# Patient Record
Sex: MALE | URBAN - METROPOLITAN AREA
[De-identification: names, ages, dates, MRNs, and addresses within clinical notes are randomized per-mention and may not be internally consistent; named-entity substitution may affect disease eponyms.]

---

## 2018-10-17 ENCOUNTER — TELEPHONE (OUTPATIENT)
Dept: DERMATOLOGY | Facility: CLINIC | Age: 38
End: 2018-10-17

## 2018-10-17 NOTE — TELEPHONE ENCOUNTER
[facility] Call Center    Phone Message    May a detailed message be left on voicemail: yes    Reason for Call: Other: Patient states he is being referred by Dr. Igor Wolfe at [facility] to see Dr. Shah for 'Scarring Alopecia'. Patient states he believes that Dr. Wolfe has faxed over referral and medical records to the clinic. Please contact patient directly to discuss appointment at [phone].       Action Taken: Message routed to:  [facility] ([facility] Dermatology Clinic

## 2018-10-26 NOTE — TELEPHONE ENCOUNTER
[facility] Call Center    Phone Message    May a detailed message be left on voicemail: yes    Reason for Call: Other: Pt called to make appt with Dr. Shah did advice to let pt know to follow up with his primary about his medical records. Did advice the pt of the process Dr Shah.     Action Taken: Message routed to:  [facility] ([facility] Dermatology

## 2018-10-29 ENCOUNTER — PRE VISIT (OUTPATIENT)
Dept: DERMATOLOGY | Facility: CLINIC | Age: 38
End: 2018-10-29

## 2018-10-29 NOTE — TELEPHONE ENCOUNTER
FUTURE VISIT INFORMATION      FUTURE VISIT INFORMATION:    Date: N/A    Time:     Location:   REFERRAL INFORMATION:    Referring provider:  DR. SIRI BEGUM    Referring providers clinic:  [facility]    Reason for visit/diagnosis  HAIR LOSS      Action    Action Taken RECEIVED RECORDS WERE REVIEWED AND PATIENT HAS BEEN ACCEPTED INTO DR. ZEE[facility]

## 2018-10-31 NOTE — TELEPHONE ENCOUNTER
Writer left voicemail for patient letting him know he has been accepted into Dr. Shah's [facility]. Writer left direct number for patient to call back.

## 2019-02-04 ENCOUNTER — OFFICE VISIT (OUTPATIENT)
Dept: DERMATOLOGY | Facility: CLINIC | Age: 39
End: 2019-02-04
Payer: COMMERCIAL

## 2019-02-04 VITALS — RESPIRATION RATE: 18 BRPM | HEART RATE: 67 BPM | SYSTOLIC BLOOD PRESSURE: 125 MMHG | DIASTOLIC BLOOD PRESSURE: 78 MMHG

## 2019-02-04 DIAGNOSIS — L30.9 DERMATITIS: ICD-10-CM

## 2019-02-04 DIAGNOSIS — L65.9 LOSS OF HAIR: ICD-10-CM

## 2019-02-04 DIAGNOSIS — L66.2 FOLLICULITIS DECALVANS: ICD-10-CM

## 2019-02-04 DIAGNOSIS — L66.2 FOLLICULITIS DECALVANS: Primary | ICD-10-CM

## 2019-02-04 LAB
BASOPHILS # BLD AUTO: 0 10E9/L (ref 0–0.2)
BASOPHILS NFR BLD AUTO: 0.8 %
DEPRECATED CALCIDIOL+CALCIFEROL SERPL-MC: 30 UG/L (ref 20–75)
DIFFERENTIAL METHOD BLD: ABNORMAL
EOSINOPHIL # BLD AUTO: 0.2 10E9/L (ref 0–0.7)
EOSINOPHIL NFR BLD AUTO: 4.5 %
ERYTHROCYTE [DISTWIDTH] IN BLOOD BY AUTOMATED COUNT: 12.4 % (ref 10–15)
FERRITIN SERPL-MCNC: 62 NG/ML (ref 26–388)
GRAM STN SPEC: ABNORMAL
HCT VFR BLD AUTO: 40.7 % (ref 40–53)
HGB BLD-MCNC: 14.3 G/DL (ref 13.3–17.7)
IMM GRANULOCYTES # BLD: 0 10E9/L (ref 0–0.4)
IMM GRANULOCYTES NFR BLD: 0.3 %
IRON SATN MFR SERPL: 34 % (ref 15–46)
IRON SERPL-MCNC: 90 UG/DL (ref 35–180)
LYMPHOCYTES # BLD AUTO: 1 10E9/L (ref 0.8–5.3)
LYMPHOCYTES NFR BLD AUTO: 27.7 %
MCH RBC QN AUTO: 31.2 PG (ref 26.5–33)
MCHC RBC AUTO-ENTMCNC: 35.1 G/DL (ref 31.5–36.5)
MCV RBC AUTO: 89 FL (ref 78–100)
MONOCYTES # BLD AUTO: 0.3 10E9/L (ref 0–1.3)
MONOCYTES NFR BLD AUTO: 9.2 %
NEUTROPHILS # BLD AUTO: 2.1 10E9/L (ref 1.6–8.3)
NEUTROPHILS NFR BLD AUTO: 57.5 %
NRBC # BLD AUTO: 0 10*3/UL
NRBC BLD AUTO-RTO: 0 /100
PLATELET # BLD AUTO: 213 10E9/L (ref 150–450)
RBC # BLD AUTO: 4.58 10E12/L (ref 4.4–5.9)
SPECIMEN SOURCE: ABNORMAL
T4 FREE SERPL-MCNC: 0.82 NG/DL (ref 0.76–1.46)
TIBC SERPL-MCNC: 265 UG/DL (ref 240–430)
TSH SERPL DL<=0.005 MIU/L-ACNC: 6.9 MU/L (ref 0.4–4)
WBC # BLD AUTO: 3.6 10E9/L (ref 4–11)

## 2019-02-04 RX ORDER — BUSPIRONE HYDROCHLORIDE 10 MG/1
TABLET ORAL
COMMUNITY
Start: 2018-01-01

## 2019-02-04 RX ORDER — ACITRETIN 25 MG/1
50 CAPSULE ORAL
COMMUNITY
Start: 2017-11-13 | End: 2019-03-16

## 2019-02-04 RX ORDER — ACETAMINOPHEN 325 MG/1
TABLET ORAL
COMMUNITY

## 2019-02-04 RX ORDER — ESCITALOPRAM OXALATE 20 MG/1
TABLET ORAL
Refills: 0 | COMMUNITY
Start: 2018-11-05

## 2019-02-04 RX ORDER — ESCITALOPRAM OXALATE 10 MG/1
TABLET ORAL
Refills: 12 | COMMUNITY
Start: 2019-01-09

## 2019-02-04 RX ORDER — FINASTERIDE 5 MG/1
10 TABLET, FILM COATED ORAL
COMMUNITY
Start: 2018-06-01

## 2019-02-04 RX ORDER — KETOCONAZOLE 20 MG/ML
SHAMPOO TOPICAL
Qty: 120 ML | Refills: 5 | Status: SHIPPED | OUTPATIENT
Start: 2019-02-04

## 2019-02-04 RX ORDER — SILDENAFIL CITRATE 20 MG/1
TABLET ORAL
Refills: 2 | COMMUNITY
Start: 2019-01-10

## 2019-02-04 RX ORDER — INFLUENZA A VIRUS A/VICTORIA/2454/2019 IVR-207 (H1N1) ANTIGEN (PROPIOLACTONE INACTIVATED), INFLUENZA A VIRUS A/HONG KONG/2671/2019 IVR-208 (H3N2) ANTIGEN (PROPIOLACTONE INACTIVATED), INFLUENZA B VIRUS B/VICTORIA/705/2018 BVR-11 ANTIGEN (PROPIOLACTONE INACTIVATED), INFLUENZA B VIRUS B/PHUKET/3073/2013 BVR-1B ANTIGEN (PROPIOLACTONE INACTIVATED) 15; 15; 15; 15 UG/.5ML; UG/.5ML; UG/.5ML; UG/.5ML
INJECTION, SUSPENSION INTRAMUSCULAR
COMMUNITY
Start: 2018-11-20

## 2019-02-04 RX ORDER — ESCITALOPRAM OXALATE 10 MG/1
10 TABLET ORAL
COMMUNITY
Start: 2018-01-01

## 2019-02-04 RX ORDER — PREDNISONE 10 MG/1
TABLET ORAL
Refills: 0 | COMMUNITY
Start: 2018-09-20

## 2019-02-04 RX ORDER — TRIAMCINOLONE ACETONIDE 1 MG/ML
LOTION TOPICAL
COMMUNITY
Start: 2015-12-29

## 2019-02-04 RX ORDER — FLUOCINONIDE TOPICAL SOLUTION USP, 0.05% 0.5 MG/ML
SOLUTION TOPICAL
COMMUNITY
Start: 2018-05-11

## 2019-02-04 ASSESSMENT — PAIN SCALES - GENERAL
PAINLEVEL: NO PAIN (0)
PAINLEVEL: NO PAIN (0)

## 2019-02-04 NOTE — PROGRESS NOTES
"[facility] Dermatology Note      Dermatology Problem List:  1. Scarring/androgenic alopecia  -differential lichen planopilaris vs folliculitis decalvans  -s/p two punch biopsies of the scalp vertex on 2/4/19, pathology and culture/gram stain pending   -current treatment: minoxidil once daily, finasteride 10 mg daily, ketoconazole 2% shampoo, and occasional use of Lidex 0.05% external solution  -labs pending (2/4/19): testosterone, TSH w/ free T4, vitamin D, iron studies, DHEA-S  -past treatments with minimal/no improvement:    Doxycycline and lidex: April 2016 - February 2017     Hydroxychlorquine: October 2016 - December 2016, discontinued 2/2 morbilliform rash eruption     Dapsone: January 2017 - May 2017, discontinued because of blurry vision     Acetretin: May 2017 - January 2018, discontinued because not showing any improvement    Finasteride (10 mg) and Minoxidil w/ occasional use of Lidex: January 2018 - present     Prednisone burst for 6 weeks in August 2018     Encounter Date: Feb 4, 2019    CC:  Chief Complaint   Patient presents with     Hair/Scalp Problem     Florin is here today to be seen for hair loss- Dr. Wolfe referral.          History of Present Illness:  Mr. Florin Lozoya is a 38 year old male who presents as a referral from Dr. Igor Wolfe for scarring/androgenic alopecia. Notes that about four years ago, he started having itchiness on the back of his scalp and neck. The area would itch so much in his sleep and would wake up with blood on his pillow. Noted bumps and scabs from the itching. Tried some different shampoos by his PCP initially which didn't seem to help. Was then referred to Dr. Wolfe in 2016 who biopsied the scalp and showed a scarring process concerning for folliculitis decalvans. Per chart review, the biopsy was noted to have a \"mixture of inflammatory cells including a pustule that extends into the superficial portion of a follicle\" and was thought to " represent folliculitis decalvans. Has tried a few different medications (see below for time course) including doxycycline, dapsone, acetretin, plaquenil, and a 6 week oral steroid burst which did not have any improvement.     Doxycycline and lidex: April 2016 - February 2017     Hydroxychlorquine: October 2016 - December 2016, discontinued 2/2 morbilliform rash eruption     Dapsone: January 2017 - May 2017, discontinued because of blurry vision     Acetretin: May 2017 - January 2018, discontinued because not showing any improvement    Finasteride (10 mg) and Minoxidil w/ occasional use of Lidex: January 2018 - present     Prednisone burst for 6 weeks in August 2018     Patient notes that he has also tried intralesional steroid injections which haven't seemed to make a difference. Nothing that he has tried so far has seemed to work.        Notes he showers once a day and is currently using some OGX products for shampoo and conditioner and will also use a pomade for hair styling. Denies any flares of his scalp when using different hair products. Does note some dry and flaky scalp when he itches. States that showering helps manage the itching and he tries to use lukewarm water to not dry out the scalp. The itching will persist throughout the day but will worsen toward night time and about once a week, he will need to take a night time shower to help calm down his scalp. Today in clinic notes that the vertex of his scalp is the most itchy. Denies any burning or pustule formation. Eats a well balanced diet. Denies any other areas of hair loss on his body, rashes or skin problems.     Past Medical History:   There is no problem list on file for this patient.    History reviewed. No pertinent past medical history.  History reviewed. No pertinent surgical history.    Social History:  Patient reports that  has never smoked. he has never used smokeless tobacco.    Family History:  History reviewed. No pertinent family  history.    Medications:  Current Outpatient Medications   Medication Sig Dispense Refill     acitretin (SORIATANE) 25 MG CAPS capsule Take 50 mg by mouth       busPIRone (BUSPAR) 10 MG tablet TAKE 1 TABLET BY MOUTH TWICE DAILY       escitalopram (LEXAPRO) 10 MG tablet Take 10 mg by mouth       finasteride (PROSCAR) 5 MG tablet Take 10 mg by mouth       fluocinonide (LIDEX) 0.05 % external solution        triamcinolone (KENALOG) 0.1 % external lotion        acetaminophen (TYLENOL) 325 MG tablet        AFLURIA QUADRIVALENT 0.5 ML injection        escitalopram (LEXAPRO) 10 MG tablet TK 1 T PO QD  12     escitalopram (LEXAPRO) 20 MG tablet TK 1 T PO QD  0     predniSONE (DELTASONE) 10 MG tablet   0     sildenafil (REVATIO) 20 MG tablet Take one tablet by mouth one hour prior to sex as needed. Do not take more than one dose every 24 hours.  2     Allergies   Allergen Reactions     Hydroxychloroquine Rash     Morbilliform eruption w/o systemic symptoms. Seen by Dr. Wolfe           Review of Systems:  -As per HPI  -Constitutional: Otherwise feeling well today, in usual state of health.  -Skin: As above in HPI. No additional skin concerns.    Physical exam:  Vitals: /78 (BP Location: Right arm, Patient Position: Sitting, Cuff Size: Adult Regular)   Pulse 67   Resp 18   GEN: This is a well developed, well-nourished male in no acute distress, in a pleasant mood.    SKIN: Focused examination of the scalp, face and hands was performed.  -mild background erythema of the scalp with perifollicular erythema ad scale covering approximately 80% of the scalp  -no papules or pustules present but some areas with small angulated eschars from excoriation  -mild alopecia at the vertex and along fronto-temporal regions   -No other lesions of concern on areas examined.     Impression/Plan:  1. Scarring alopecia w/ androgenic involvement    Differential diagnosis at this time points more toward lichen planopilaris than original  diagnosis of mild folliculitis decalvans. Given diffuse scalp involvement on exam with perifollicular erythema and scale, more demonstrative of lichen planopilaris at this time. No papules or pustules in the posterior scalp to suggest more of a folliculitis decalvans however at this time, patient has been refractory to multiple medication regimens over a period of 2+ years meriting further workup and rebiopsyig of lesions. Two biopsies taken in clinic today with one being sent for gram stain and culture to assess for any bacterial involvement of the scalp     Labs ordered for today:  testosterone, TSH w/ free T4, vitamin D, iron studies, DHEA-S    Sent prescription for ketoconazole 2% shampoo     Punch biopsy: After discussion of benefits and risks including but not limited to bleeding, infection, scar, incomplete removal, recurrence, and non-diagnostic biopsy, written consent and photographs were obtained. Time-out was performed. The area was cleaned with isopropyl alcohol. 1.5 mL of 1% lidocaine with 1:100,000 epinephrine was injected to obtain adequate anesthesia of the lesion on the scalp. Two  4 mm punch biopsy was performed.  3-0 prolene sutures were utilized to approximate the epidermal edges.  White petroleum jelly/VaselineTM and a bandage was applied to the wound.  Explicit verbal and written wound care instructions were provided. One punch biopsy was sent for culture and another for pathology.  The patient left the Dermatology Clinic in good condition. prolene      CC Igor Wolfe MD  [facility] DERMATOLOGY  [address] [place] [phone] on close of this encounter.  Follow-up in 3.5 months for ongoing follow-up.     Staff Involved:  I, Win Mclean, MS4, saw and examined the patient in the presence of Dr. Shah.    Staff Physician:  I was present with the medical student who participated in the service and in the documentation of the note. I have verified the history and personally performed  the physical exam and medical decision making. I agree with the assessment and plan of care as documented in the note.  The biopsy procedure was done together.    Julianna Shah MD  Professor and Chair  Department of Dermatology  [facility]: Phone: [phone], Fax:[phone]  [facility]: Phone: [phone], Fax: [phone]

## 2019-02-04 NOTE — LETTER
2/4/2019       RE: Florin Lozoya  [address] [place] [address]     Dear Colleague,    Thank you for referring your patient, Florin Lozoya, to the [facility] DERMATOLOG[facility] at [facility]. Please see a copy of my visit note below.    [facility] Dermatology Note      Dermatology Problem List:  1. Scarring/androgenic alopecia  -differential lichen planopilaris vs folliculitis decalvans  -s/p two punch biopsies of the scalp vertex on 2/4/19, pathology and culture/gram stain pending   -current treatment: minoxidil once daily, finasteride 10 mg daily, ketoconazole 2% shampoo, and occasional use of Lidex 0.05% external solution  -labs pending (2/4/19): testosterone, TSH w/ free T4, vitamin D, iron studies, DHEA-S  -past treatments with minimal/no improvement:    Doxycycline and lidex: April 2016 - February 2017     Hydroxychlorquine: October 2016 - December 2016, discontinued 2/2 morbilliform rash eruption     Dapsone: January 2017 - May 2017, discontinued because of blurry vision     Acetretin: May 2017 - January 2018, discontinued because not showing any improvement    Finasteride (10 mg) and Minoxidil w/ occasional use of Lidex: January 2018 - present     Prednisone burst for 6 weeks in August 2018     Encounter Date: Feb 4, 2019    CC:  Chief Complaint   Patient presents with     Hair/Scalp Problem     Florin is here today to be seen for hair loss- Dr. Wolfe referral.          History of Present Illness:  Mr. Florin Lozoya is a 38 year old male who presents as a referral from Dr. Igor Wolfe for scarring/androgenic alopecia. Notes that about four years ago, he started having itchiness on the back of his scalp and neck. The area would itch so much in his sleep and would wake up with blood on his pillow. Noted bumps and scabs from the itching. Tried some different shampoos by his PCP initially which didn't seem to help. Was then referred to Dr. Wolfe in  "2016 who biopsied the scalp and showed a scarring process concerning for folliculitis decalvans. 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 Has tried a few different medications (see below for time course) including doxycycline, dapsone, acetretin, plaquenil, and a 6 week oral steroid burst which did not have any improvement.     Doxycycline and lidex: April 2016 - February 2017     Hydroxychlorquine: October 2016 - December 2016, discontinued 2/2 morbilliform rash eruption     Dapsone: January 2017 - May 2017, discontinued because of blurry vision     Acetretin: May 2017 - January 2018, discontinued because not showing any improvement    Finasteride (10 mg) and Minoxidil w/ occasional use of Lidex: January 2018 - present     Prednisone burst for 6 weeks in August 2018     Patient notes that he has also tried intralesional steroid injections which haven't seemed to make a difference. Nothing that he has tried so far has seemed to work.        Notes he showers once a day and is currently using some OGX products for shampoo and conditioner and will also use a pomade for hair styling. Denies any flares of his scalp when using different hair products. Does note some dry and flaky scalp when he itches. States that showering helps manage the itching and he tries to use lukewarm water to not dry out the scalp. The itching will persist throughout the day but will worsen toward night time and about once a week, he will need to take a night time shower to help calm down his scalp. Today in clinic notes that the vertex of his scalp is the most itchy. Denies any burning or pustule formation. Eats a well balanced diet. Denies any other areas of hair loss on his body, rashes or skin problems.     Past Medical History:   There is no problem list on file for this " patient.    History reviewed. No pertinent past medical history.  History reviewed. No pertinent surgical history.    Social History:  Patient reports that  has never smoked. he has never used smokeless tobacco.    Family History:  History reviewed. No pertinent family history.    Medications:  Current Outpatient Medications   Medication Sig Dispense Refill     acitretin (SORIATANE) 25 MG CAPS capsule Take 50 mg by mouth       busPIRone (BUSPAR) 10 MG tablet TAKE 1 TABLET BY MOUTH TWICE DAILY       escitalopram (LEXAPRO) 10 MG tablet Take 10 mg by mouth       finasteride (PROSCAR) 5 MG tablet Take 10 mg by mouth       fluocinonide (LIDEX) 0.05 % external solution        triamcinolone (KENALOG) 0.1 % external lotion        acetaminophen (TYLENOL) 325 MG tablet        AFLURIA QUADRIVALENT 0.5 ML injection        escitalopram (LEXAPRO) 10 MG tablet TK 1 T PO QD  12     escitalopram (LEXAPRO) 20 MG tablet TK 1 T PO QD  0     predniSONE (DELTASONE) 10 MG tablet   0     sildenafil (REVATIO) 20 MG tablet Take one tablet by mouth one hour prior to sex as needed. Do not take more than one dose every 24 hours.  2     Allergies   Allergen Reactions     Hydroxychloroquine Rash     Morbilliform eruption w/o systemic symptoms. Seen by Dr. Wolfe           Review of Systems:  -As per HPI  -Constitutional: Otherwise feeling well today, in usual state of health.  -Skin: As above in HPI. No additional skin concerns.    Physical exam:  Vitals: /78 (BP Location: Right arm, Patient Position: Sitting, Cuff Size: Adult Regular)   Pulse 67   Resp 18   GEN: This is a well developed, well-nourished male in no acute distress, in a pleasant mood.    SKIN: Focused examination of the scalp, face and hands was performed.  -mild background erythema of the scalp with perifollicular erythema ad scale covering approximately 80% of the scalp  -no papules or pustules present but some areas with small angulated eschars from excoriation  -mild  alopecia at the vertex and along fronto-temporal regions   -No other lesions of concern on areas examined.     Impression/Plan:  1. Scarring alopecia w/ androgenic involvement    Differential diagnosis at this time points more toward lichen planopilaris than original diagnosis of mild folliculitis decalvans. Given diffuse scalp involvement on exam with perifollicular erythema and scale, more demonstrative of lichen planopilaris at this time. No papules or pustules in the posterior scalp to suggest more of a folliculitis decalvans however at this time, patient has been refractory to multiple medication regimens over a period of 2+ years meriting further workup and rebiopsyig of lesions. Two biopsies taken in clinic today with one being sent for gram stain and culture to assess for any bacterial involvement of the scalp     Labs ordered for today:  testosterone, TSH w/ free T4, vitamin D, iron studies, DHEA-S    Sent prescription for ketoconazole 2% shampoo     Punch biopsy: After discussion of benefits and risks including but not limited to bleeding, infection, scar, incomplete removal, recurrence, and non-diagnostic biopsy, written consent and photographs were obtained. Time-out was performed. The area was cleaned with isopropyl alcohol. 1.5 mL of 1% lidocaine with 1:100,000 epinephrine was injected to obtain adequate anesthesia of the lesion on the scalp. Two  4 mm punch biopsy was performed.  3-0 prolene sutures were utilized to approximate the epidermal edges.  White petroleum jelly/VaselineTM and a bandage was applied to the wound.  Explicit verbal and written wound care instructions were provided. One punch biopsy was sent for culture and another for pathology.  The patient left the Dermatology Clinic in good condition. prolene      CC Igor Wolfe MD  [facility] DERMATOLOGY  [address] [place] [phone] on close of this encounter.  Follow-up in 3.5 months for ongoing follow-up.     Staff  Involved:  I, Win Mclean, MS4, saw and examined the patient in the presence of Dr. Shah.    Staff Physician:  I was present with the medical student who participated in the service and in the documentation of the note. I have verified the history and personally performed the physical exam and medical decision making. I agree with the assessment and plan of care as documented in the note.  The biopsy procedure was done together.                            Pictures were placed in Pt's chart today for future reference.    Julianna Shah MD

## 2019-02-04 NOTE — PATIENT INSTRUCTIONS
Today during your visit:   Today we took two biopsies from your scalp, one that will be sent for culture to make sure there is no scalp infection, and another one to help us with the diagnosis. We are also going to get some labs from you today after your appointment to make sure that you are in a position for optimal hair growth.     We will call you with the results of the biopsy and over the phone we will make a plan together for treatment. We will plan to have you return in about 3.5 months then for follow-up.     We also sent some prescription shampoo to your pharmacy for keeping your scalp healthy.     Wound Care After a Biopsy    What is a skin biopsy?  A skin biopsy allows the doctor to examine a very small piece of tissue under the microscope to determine the diagnosis and the best treatment for the skin condition. A local anesthetic (numbing medicine)  is injected with a very small needle into the skin area to be tested. A small piece of skin is taken from the area. Sometimes a suture (stitch) is used.     What are the risks of a skin biopsy?  I will experience scar, bleeding, swelling, pain, crusting and redness. I may experience incomplete removal or recurrence. Risks of this procedure are excessive bleeding, bruising, infection, nerve damage, numbness, thick (hypertrophic or keloidal) scar and non-diagnostic biopsy.    How should I care for my wound for the first 24 hours?    Keep the wound dry and covered for 24 hours    If it bleeds, hold direct pressure on the area for 15 minutes. If bleeding does not stop then go to the emergency room    Avoid strenuous exercise the first 1-2 days or as your doctor instructs you    How should I care for the wound after 24 hours?    After 24 hours, remove the bandage    You may bathe or shower as normal    If you had a scalp biopsy, you can shampoo as usual and can use shower water to clean the biopsy site daily    Clean the wound twice a day with gentle soap and  water    Do not scrub, be gentle    Apply white petroleum/Vaseline after cleaning the wound with a cotton swab or a clean finger, and keep the site covered with a Bandaid /bandage. Bandages are not necessary with a scalp biopsy    If you are unable to cover the site with a Bandaid /bandage, re-apply ointment 2-3 times a day to keep the site moist. Moisture will help with healing    Avoid strenuous activity for first 1-2 days    Avoid lakes, rivers, pools, and oceans until the stitches are removed or the site is healed    How do I clean my wound?    Wash hands thoroughly with soap or use hand  before all wound care    Clean the wound with gentle soap and water    Apply white petroleum/Vaseline  to wound after it is clean    Replace the Bandaid /bandage to keep the wound covered for the first few days or as instructed by your doctor    If you had a scalp biopsy, warm shower water to the area on a daily basis should suffice    What should I use to clean my wound?     Cotton-tipped applicators (Qtips )    White petroleum jelly (Vaseline ). Use a clean new container and use Q-tips to apply.    Bandaids   as needed    Gentle soap     How should I care for my wound long term?    Do not get your wound dirty    Keep up with wound care for one week or until the area is healed.    A small scab will form and fall off by itself when the area is completely healed. The area will be red and will become pink in color as it heals. Sun protection is very important for how your scar will turn out. Sunscreen with an SPF 30 or greater is recommended once the area is healed.    If you have stitches, stitches need to be removed in 14 days. You may return to our clinic for this or you may have it done locally at your doctor s office.    You should have some soreness but it should be mild and slowly go away over several days. Talk to your doctor about using tylenol for pain,    When should I call my doctor?  If you have increased:      Pain or swelling    Pus or drainage (clear or slightly yellow drainage is ok)    Temperature over 100F    Spreading redness or warmth around wound    When will I hear about my results?  The biopsy results can take 2-3 weeks to come back. The clinic will call you with the results, send you a [employer] message, or have you schedule a follow-up clinic or phone time to discuss the results. Contact our clinics if you do not hear from us in 3 weeks.     Who should I call with questions?    [facility]: [phone]     [facility]: [phone]    For urgent needs outside of business hours call the [facility] at [phone] and ask for the dermatology resident on call

## 2019-02-04 NOTE — NURSING NOTE
Lidocaine-epinephrine 1-1:445874 % injection   3mL once for one use, starting 2/4/2019 ending 2/4/2019,  2mL disp, R-0, injection  Injected by Dr. Shah

## 2019-02-04 NOTE — NURSING NOTE
Dermatology Rooming Note    Florin Lozoya's goals for this visit include:   Chief Complaint   Patient presents with     Hair/Scalp Problem     Florin is here today to be seen for hair loss- Dr. Wolfe referral.      ROSALIO Engel

## 2019-02-05 LAB — DHEA-S SERPL-MCNC: 97 UG/DL (ref 80–560)

## 2019-02-06 LAB
SHBG SERPL-SCNC: 58 NMOL/L (ref 11–80)
TESTOST FREE SERPL-MCNC: 9.47 NG/DL (ref 4.7–24.4)
TESTOST SERPL-MCNC: 636 NG/DL (ref 240–950)

## 2019-02-08 LAB
BACTERIA SPEC CULT: ABNORMAL
BACTERIA SPEC CULT: ABNORMAL
SPECIMEN SOURCE: ABNORMAL

## 2019-02-25 LAB — COPATH REPORT: NORMAL

## 2019-03-14 ENCOUNTER — DOCUMENTATION ONLY (OUTPATIENT)
Dept: DERMATOLOGY | Facility: CLINIC | Age: 39
End: 2019-03-14

## 2019-03-16 ENCOUNTER — TELEPHONE (OUTPATIENT)
Dept: DERMATOLOGY | Facility: CLINIC | Age: 39
End: 2019-03-16

## 2019-03-16 DIAGNOSIS — L66.9 CICATRICIAL ALOPECIA: Primary | ICD-10-CM

## 2019-03-16 DIAGNOSIS — L30.9 DERMATITIS: ICD-10-CM

## 2019-03-16 RX ORDER — CLOBETASOL PROPIONATE 0.05 G/100ML
SHAMPOO TOPICAL
Qty: 60 ML | Refills: 3 | Status: SHIPPED | OUTPATIENT
Start: 2019-03-16

## 2019-03-16 NOTE — TELEPHONE ENCOUNTER
This afternoon I had the opportunity to review Mr. Lozoya's blood test results and biopsy reports.  He notes his scalp is very pruritic, especially in the evening and especially around the biopsy sites, which he reports are doing well. He notes that since his last visit with us, he is aiming to slowly discontinue his buspar and lexapro medications and is slowly tapering off these. In regards to his scalp, he reports using 2% ketoconzole shampoo twice a week and has added Head & Shoulders to his routine as well OGX shampoo. He shampoos daily.    We reviewed his biopsy and blood test results. The blood test results are fine overall but his TSH was slightly elevated at 6.90 but the T4 was normal. Florin's white blood count was slightly low at 3.6 (4-11). The biopsies were interpreted as follows:    A-B - Horizontal and vertical sections are reviewed.  Vertical sections   demonstrate 3 hair follicles, one of which is in non-anagen phase, with partially retained sebaceous glands and mild perivascular and perifollicular lymphocytic inflammation with scattered plasma cells. The overlying epidermis appears normal.   Horizontal sections demonstrate approximately 28 hair follicles, with   concentric perifollicular fibrosis, and compound follicles seen in groups of up to four as well as the presence of plasma cells and occasional   neutrophils. The inflammatory infiltrate involve the majority of the follicular units with plasma cells and occasional neutrophils.     Based on this information, the following was recommended:  Begin Clobex shampoo 5 times per week  2% ketoconazole shampoo 2 times per week  Repeat WBC and TSH/T4  Phone follow-up in about 3 weeks  Add Allegra 180 mid day  If improved at the next phone follow-up, will continue same treatment plan. If not, would continue same approach but ask Mr. Lozoya to see Dr. Wolfe for ILK 10 injections to the [facility].  RT here in May as already scheduled.    Copies of this  telephone note to both Mr. Lozoya and Dr. Wolfe.    Julianna Shah MD  Professor and Chair  Department of Dermatology  [facility]

## 2019-03-22 ENCOUNTER — TELEPHONE (OUTPATIENT)
Dept: DERMATOLOGY | Facility: CLINIC | Age: 39
End: 2019-03-22

## 2019-03-22 NOTE — TELEPHONE ENCOUNTER
Left patient a message to check with his pharmacy as Dr Shah would be prescribing an abx for him to take for 2-3 weeks due to bacterial culture results that were not discussed in their previous phone conversation.     Message given to Shamika MOONEY CMA to discuss with provider. Abx still needs to be prescribed.

## 2019-03-25 DIAGNOSIS — B96.89 BACTERIAL SKIN INFECTION: Primary | ICD-10-CM

## 2019-03-25 DIAGNOSIS — L08.9 BACTERIAL SKIN INFECTION: Primary | ICD-10-CM

## 2019-03-25 RX ORDER — ERYTHROMYCIN 500 MG/1
TABLET, COATED ORAL
Qty: 100 TABLET | Refills: 1 | Status: SHIPPED | OUTPATIENT
Start: 2019-03-25 | End: 2019-03-25

## 2019-03-25 RX ORDER — CLINDAMYCIN HCL 150 MG
CAPSULE ORAL
Qty: 30 CAPSULE | Refills: 1 | Status: SHIPPED | OUTPATIENT
Start: 2019-03-25

## 2019-05-13 ENCOUNTER — OFFICE VISIT (OUTPATIENT)
Dept: DERMATOLOGY | Facility: CLINIC | Age: 39
End: 2019-05-13
Payer: COMMERCIAL

## 2019-05-13 VITALS — HEART RATE: 55 BPM | DIASTOLIC BLOOD PRESSURE: 81 MMHG | SYSTOLIC BLOOD PRESSURE: 132 MMHG

## 2019-05-13 DIAGNOSIS — R89.9 ABNORMAL LABORATORY TEST RESULT: ICD-10-CM

## 2019-05-13 DIAGNOSIS — R89.9 ABNORMAL LABORATORY TEST RESULT: Primary | ICD-10-CM

## 2019-05-13 DIAGNOSIS — B96.89 BACTERIAL SKIN INFECTION: ICD-10-CM

## 2019-05-13 DIAGNOSIS — L08.9 BACTERIAL SKIN INFECTION: ICD-10-CM

## 2019-05-13 DIAGNOSIS — L66.9 CICATRICIAL ALOPECIA: ICD-10-CM

## 2019-05-13 LAB
BASOPHILS # BLD AUTO: 0.1 10E9/L (ref 0–0.2)
BASOPHILS NFR BLD AUTO: 1.3 %
DIFFERENTIAL METHOD BLD: ABNORMAL
EOSINOPHIL # BLD AUTO: 0.1 10E9/L (ref 0–0.7)
EOSINOPHIL NFR BLD AUTO: 3.1 %
ERYTHROCYTE [DISTWIDTH] IN BLOOD BY AUTOMATED COUNT: 12.9 % (ref 10–15)
HCT VFR BLD AUTO: 43.3 % (ref 40–53)
HGB BLD-MCNC: 14.6 G/DL (ref 13.3–17.7)
IMM GRANULOCYTES # BLD: 0 10E9/L (ref 0–0.4)
IMM GRANULOCYTES NFR BLD: 0.3 %
LYMPHOCYTES # BLD AUTO: 1.3 10E9/L (ref 0.8–5.3)
LYMPHOCYTES NFR BLD AUTO: 34.2 %
MCH RBC QN AUTO: 30.5 PG (ref 26.5–33)
MCHC RBC AUTO-ENTMCNC: 33.7 G/DL (ref 31.5–36.5)
MCV RBC AUTO: 90 FL (ref 78–100)
MONOCYTES # BLD AUTO: 0.4 10E9/L (ref 0–1.3)
MONOCYTES NFR BLD AUTO: 9.1 %
NEUTROPHILS # BLD AUTO: 2 10E9/L (ref 1.6–8.3)
NEUTROPHILS NFR BLD AUTO: 52 %
NRBC # BLD AUTO: 0 10*3/UL
NRBC BLD AUTO-RTO: 0 /100
PLATELET # BLD AUTO: 225 10E9/L (ref 150–450)
RBC # BLD AUTO: 4.79 10E12/L (ref 4.4–5.9)
T4 FREE SERPL-MCNC: 0.83 NG/DL (ref 0.76–1.46)
TSH SERPL DL<=0.005 MIU/L-ACNC: 7.55 MU/L (ref 0.4–4)
WBC # BLD AUTO: 3.8 10E9/L (ref 4–11)

## 2019-05-13 ASSESSMENT — PAIN SCALES - GENERAL
PAINLEVEL: MODERATE PAIN (4)
PAINLEVEL: NO PAIN (0)

## 2019-05-13 NOTE — PROGRESS NOTES
[facility] Dermatology Note      Dermatology Problem List:  1. 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 based on tissue culture, treated with clindamycin  -current treatment: minoxidil once daily, finasteride 5 mg daily, clobetasol 0.05% shampoo 5x/week with Head and Shoulders 2x/week, j  -labs done 2/4/19 (testosterone, TSH w/ free T4, vitamin D, iron studies, DHEA-S) were all normal  Except for slightly elevated TSH - will repeat today  -past treatments with minimal/no improvement:    Doxycycline and lidex: April 2016 - February 2017     Hydroxychlorquine: October 2016 - December 2016, discontinued 2/2 morbilliform rash eruption     Dapsone: January 2017 - May 2017, discontinued because of blurry vision     Acetretin: May 2017 - January 2018, discontinued because not showing any improvement    Finasteride (5 mg) and Minoxidil w/ occasional use of Lidex: January 2018 - present     Prednisone burst for 6 weeks in August 2018     Ketoconazole 2% shampoo, Lidex 0.05% solution      Encounter Date: May 13, 2019    CC:  Chief Complaint   Patient presents with     Hair Loss     Florin is here today for a hair loss follow up.          History of Present Illness:  Mr. Florin Lozoya is a 38 year old male who presents as a follow-up for Scarring and alopecia. The patient was last seen 2/2019 when his scalp was biopsied.  The results were discussed in a telephone conversation and are summarized here:    This afternoon I had the opportunity to review Mr. Lozoya's blood test results and biopsy reports.  He notes his scalp is very pruritic, especially in the evening and especially around the biopsy sites, which he reports are doing well. He notes that since his last visit with us, he is aiming to slowly discontinue his buspar and lexapro medications and is slowly tapering off these. In regards to his scalp, he reports using 2% ketoconzole shampoo twice a week  and has added Head & Shoulders to his routine as well OGX shampoo. He shampoos daily.     We reviewed his biopsy and blood test results. The blood test results are fine overall but his TSH was slightly elevated at 6.90 but the T4 was normal. Florin's white blood count was slightly low at 3.6 (4-11). The biopsies were interpreted as follows:     A-B - Horizontal and vertical sections are reviewed.  Vertical sections demonstrate 3 hair follicles, one of which is in non-anagen phase, with partially retained sebaceous glands and mild perivascular and perifollicular lymphocytic inflammation with scattered plasma cells. The overlying epidermis appears normal.   Horizontal sections demonstrate approximately 28 hair follicles, with concentric perifollicular fibrosis, and compound follicles seen in groups of up to four as well as the presence of plasma cells and occasional   neutrophils. The inflammatory infiltrate involve the majority of the follicular units with plasma cells and occasional neutrophils.      Based on this information, the following was recommended:  Clobex shampoo 5 times per week  2% ketoconazole shampoo 2 times per week  Repeat WBC and TSH/T4  Add Allegra 180 mid day  If improved at the next phone follow-up, will continue same treatment plan. If not, would continue same approach but ask Mr. Lozoya to see Dr. Wolfe for ILK 10 injections to the spots.      We also did a tissue culture from the scalp and this showed light growth staph aureus and lugdunensis. He finished a course of clindamycin 150 mg BID for scalp folliculitis.     He reports he has been healthy since last visit and is using the medications as prescribed.  He used to have constant itching and scabs on his scalp and now it is a more mild irritation with some pinkness. Patient reports no itching, pain, or burning of the scalp. He is not using the OGX shampoo. He cut the finasteride in half, from 10 mg to 5 mg a day.     Past Medical History:    Patient Active Problem List   Diagnosis     Cicatricial alopecia     History reviewed. No pertinent past medical history.  History reviewed. No pertinent surgical history.    Social History:  Patient reports that he has never smoked. He has never used smokeless tobacco.    Family History:  History reviewed. No pertinent family history.    Medications:  Current Outpatient Medications   Medication Sig Dispense Refill     acetaminophen (TYLENOL) 325 MG tablet        AFLURIA QUADRIVALENT 0.5 ML injection        busPIRone (BUSPAR) 10 MG tablet TAKE 1 TABLET BY MOUTH TWICE DAILY       clindamycin (CLEOCIN) 150 MG capsule Take one tablet twice a day as tolerated 30 capsule 1     clobetasol propionate (CLOBEX) 0.05 % external shampoo Apply to a dry scalp, leave on for 10 minutes, then lather and rinse, do 5 days of the week, use Head & Shoulders the other 2 days 60 mL 3     escitalopram (LEXAPRO) 10 MG tablet Take 10 mg by mouth       escitalopram (LEXAPRO) 10 MG tablet TK 1 T PO QD  12     escitalopram (LEXAPRO) 20 MG tablet TK 1 T PO QD  0     finasteride (PROSCAR) 5 MG tablet Take 10 mg by mouth       sildenafil (REVATIO) 20 MG tablet Take one tablet by mouth one hour prior to sex as needed. Do not take more than one dose every 24 hours.  2     fluocinonide (LIDEX) 0.05 % external solution        ketoconazole (NIZORAL) 2 % external shampoo Apply to a wet scalp, lather and massage into skin, leave on for a couple of minutes, then rinse, rotate with use of clobex shampoo (Patient not taking: Reported on 5/13/2019) 120 mL 5     predniSONE (DELTASONE) 10 MG tablet   0     triamcinolone (KENALOG) 0.1 % external lotion        Allergies   Allergen Reactions     Hydroxychloroquine Rash     Morbilliform eruption w/o systemic symptoms. Seen by Dr. Wolfe           Review of Systems:  -Constitutional: Otherwise feeling well today, in usual state of health.  -Skin: As above in HPI. No additional skin concerns.    Physical  exam:  Vitals: /81 (BP Location: Right arm, Patient Position: Sitting, Cuff Size: Adult Regular)   Pulse 55   GEN: This is a well developed, well-nourished male in no acute distress, in a pleasant mood.    SKIN: Focused examination of the scalp and face was performed.  - Mild perifolicullar erythema and scale   - Hair pull test is negative and notes that it is not spreading   - LPPAI score of 1  -No other lesions of concern on areas examined.     Impression/Plan:  1. Scarring alopecia with androgenic involvement, biopsy proven    Continue Clobex shampoo five days a week and head and shoulders on the weekends    Repeat TSH and CBC     Kenalog intralesional injection procedure note: After verbal consent and discussion of risks including but not limited to atrophy, pain, and bruising, time out was performed, the patient underwent positioning and the area was prepped with isopropyl alcohol, 2 total cc of Kenalog 10 mg/cc was injected into 20 sites on the scalp. The patient tolerated the procedure well and left the Dermatology clinic in good condition.    Follow-up in August or  earlier for new or changing lesions.    Staff Involved:    Scribe Disclosure  I, Aleksandra Mckinnon, am serving as a scribe to document services personally performed by Dr. Julianna Shah MD, based on data collection and the provider's statements to me.    .Provider Disclosure:   I agree with above History, Review of Systems, Physical exam and Plan. I have reviewed the content of the documentation and have edited it as needed. I have personally performed the services documented here and the documentation accurately represents those services and the decisions I have made.  ILK injections were done by me.    Julianna Shah MD  Professor and Chair  Department of Dermatology  [facility]

## 2019-05-13 NOTE — PATIENT INSTRUCTIONS
Intralesional Kenalog (ILK) Injections    Intralesional steroid injection involves a corticosteroid, such as triamcinolone acetonide (brand name Kenalog), which is injected directly into a lesion on or immediately below the skin. Intralesional kenalog may be used to treat the following skin conditions:      Alopecia areata    Discoid lupus erythematosus    Keloids/hypertrophic scars    Granuloma annulare and other granulomatous disorders    Hypertrophic lichen planus    Lichen simplex chronicus (neurodermatitis)    Localised psoriasis    Necrobiosis lipoidica    Acne cysts (nodulocystic acne)    Small infantile hemangiomas    Possible side-effects of intralesional Kenalog (ILK) injections include bleeding, pain, infection, contact dermatitis, nerve damage, scar formation and need for a repeat procedure.    Some people may experience delayed side-effects including:    Lipoatrophy, appearing as skin indentations or dimples around the injection sites a few weeks after treatment; these may be permanent.    White marks (leukoderma) or brown marks (postinflammatory pigmentation) at the site of injection or spreading from the site of injection - these may resolve or persist long term.    Telangiectasia, or small dilated blood vessels at the site of injection.     Increased hair growth at the site of injection - this resolves eventually.    Steroid acne: steroids increase growth hormone, leading to increased sebum (oil) production by the sebaceous glands. Steroid acne generally improves once the steroid has been stopped.      Who should I call with questions?    [facility]: [phone]     [facility]: [phone]    For urgent needs outside of business hours call the [facility] at [phone] and ask for the dermatology resident on call

## 2019-05-13 NOTE — PROGRESS NOTES
[facility] Dermatology Note      Dermatology Problem List:  1. Scarring/androgenic alopecia  -differential lichen planopilaris vs folliculitis decalvans  -s/p two punch biopsies of the scalp vertex on 2/4/19, pathology and culture/gram stain pending   -current treatment: minoxidil once daily, finasteride 10 mg daily, ketoconazole 2% shampoo, and occasional use of Lidex 0.05% external solution  -labs done 2/4/19 (testosterone, TSH w/ free T4, vitamin D, iron studies, DHEA-S) were all normal   -past treatments with minimal/no improvement:    Doxycycline and lidex: April 2016 - February 2017     Hydroxychlorquine: October 2016 - December 2016, discontinued 2/2 morbilliform rash eruption     Dapsone: January 2017 - May 2017, discontinued because of blurry vision     Acetretin: May 2017 - January 2018, discontinued because not showing any improvement    Finasteride (10 mg) and Minoxidil w/ occasional use of Lidex: January 2018 - present     Prednisone burst for 6 weeks in August 2018       Encounter Date: May 13, 2019    CC:  Chief Complaint   Patient presents with     Hair Loss     Florin is here today for a hair loss follow up.          History of Present Illness:  Mr. Florin Lozoya is a 38 year old male who presents as a follow-up for Scarring and androgenetic alopecia. The patient was last seen 2/2019 when his scalp was biopsied. This showed scarring alopecia. We also did a tissue culture from the scalp and this showed light growth staph aureus and lugdunensis. He finished 15 day course of clindamycin 150 mg BID for scalp folliculitis with staph.       Current treatment includes Clobex shampoo 5 times per week, 2% ketoconazole shampoo 2 times per week, Add Allegra 180 mid day    No new medications or medical conditions.     Past Medical History:   There is no problem list on file for this patient.    History reviewed. No pertinent past medical history.  History reviewed. No pertinent surgical  history.    Social History:  Patient reports that he has never smoked. He has never used smokeless tobacco.    Family History:  History reviewed. No pertinent family history.    Medications:  Current Outpatient Medications   Medication Sig Dispense Refill     acetaminophen (TYLENOL) 325 MG tablet        AFLURIA QUADRIVALENT 0.5 ML injection        busPIRone (BUSPAR) 10 MG tablet TAKE 1 TABLET BY MOUTH TWICE DAILY       clindamycin (CLEOCIN) 150 MG capsule Take one tablet twice a day as tolerated 30 capsule 1     clobetasol propionate (CLOBEX) 0.05 % external shampoo Apply to a dry scalp, leave on for 10 minutes, then lather and rinse, do 5 days of the week, use Head & Shoulders the other 2 days 60 mL 3     escitalopram (LEXAPRO) 10 MG tablet Take 10 mg by mouth       escitalopram (LEXAPRO) 10 MG tablet TK 1 T PO QD  12     escitalopram (LEXAPRO) 20 MG tablet TK 1 T PO QD  0     finasteride (PROSCAR) 5 MG tablet Take 10 mg by mouth       sildenafil (REVATIO) 20 MG tablet Take one tablet by mouth one hour prior to sex as needed. Do not take more than one dose every 24 hours.  2     fluocinonide (LIDEX) 0.05 % external solution        ketoconazole (NIZORAL) 2 % external shampoo Apply to a wet scalp, lather and massage into skin, leave on for a couple of minutes, then rinse, rotate with use of clobex shampoo (Patient not taking: Reported on 5/13/2019) 120 mL 5     predniSONE (DELTASONE) 10 MG tablet   0     triamcinolone (KENALOG) 0.1 % external lotion        Allergies   Allergen Reactions     Hydroxychloroquine Rash     Morbilliform eruption w/o systemic symptoms. Seen by Dr. Wolfe           Review of Systems:  -{ROS:403916}  -Constitutional: Otherwise feeling well today, in usual state of health.  -Skin: As above in HPI. No additional skin concerns.    Physical exam:  Vitals: /81 (BP Location: Right arm, Patient Position: Sitting, Cuff Size: Adult Regular)   Pulse 55   GEN: This is a well developed,  well-nourished male in no acute distress, in a pleasant mood.    SKIN: {Skin Exam:399094}  -***  -***  -***  -No other lesions of concern on areas examined.     Impression/Plan:  1. Scarring alopecia with androgenic involvement, biopsy proven    {dermmedicalstudent:775284}    Continue ketoconazole 2% shampoo     ***    2. {Diagnosesderm:736732}    {dermmedicalstudent:516155}    ***    3. {Diagnosesderm:737714}    {dermmedicalstudent:664012}    ***    4. {Diagnosesderm:598317}    {dermmedicalstudent:721519}    ***    CC Julianna Shah MD  [address] [place] [address] on close of this encounter.  Follow-up {rfmultiple:687349} {follow up in days/weeks/months:731739}.     Staff Involved:  Arcelia Carl MD  Dermatology Research Fellow

## 2019-05-13 NOTE — NURSING NOTE
Dermatology Rooming Note    Florin Lozoya's goals for this visit include:   Chief Complaint   Patient presents with     Hair Loss     Florin is here today for a hair loss follow up.      ROSALIO Engel

## 2019-05-13 NOTE — LETTER
5/13/2019       RE: Florin Lozoya  [address] [place] [address]     Dear Colleague,    Thank you for referring your patient, Florin Lozoya, to the [facility] DERMATOLOGY at [facility]. Please see a copy of my visit note below.    [facility] Dermatology Note      Dermatology Problem List:  1.  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 based on tissue culture, treated with clindamycin  -current treatment: minoxidil once daily, finasteride 5 mg daily, clobetasol 0.05% shampoo 5x/week with Head and Shoulders 2x/week, j  -labs done 2/4/19 (testosterone, TSH w/ free T4, vitamin D, iron studies, DHEA-S) were all normal  Except for slightly elevated TSH - will repeat today  -past treatments with minimal/no improvement:    Doxycycline and lidex: April 2016 - February 2017     Hydroxychlorquine: October 2016 - December 2016, discontinued 2/2 morbilliform rash eruption     Dapsone: January 2017 - May 2017, discontinued because of blurry vision     Acetretin: May 2017 - January 2018, discontinued because not showing any improvement    Finasteride (5 mg) and Minoxidil w/ occasional use of Lidex: January 2018 - present     Prednisone burst for 6 weeks in August 2018     Ketoconazole 2% shampoo, Lidex 0.05% solution      Encounter Date: May 13, 2019    CC:  Chief Complaint   Patient presents with     Hair Loss     Florin is here today for a hair loss follow up.          History of Present Illness:  Mr. Florin Lozoya is a 38 year old male who presents as a follow-up for Scarring and alopecia. The patient was last seen 2/2019 when his scalp was biopsied.  The results were discussed in a telephone conversation and are summarized here:    This afternoon I had the opportunity to review Mr. Lozoya's blood test results and biopsy reports.  He notes his scalp is very pruritic, especially in the evening and especially  around the biopsy sites, which he reports are doing well. He notes that since his last visit with us, he is aiming to slowly discontinue his buspar and lexapro medications and is slowly tapering off these. In regards to his scalp, he reports using 2% ketoconzole shampoo twice a week and has added Head & Shoulders to his routine as well OGX shampoo. He shampoos daily.     We reviewed his biopsy and blood test results. The blood test results are fine overall but his TSH was slightly elevated at 6.90 but the T4 was normal. Florin's white blood count was slightly low at 3.6 (4-11). The biopsies were interpreted as follows:     A-B - Horizontal and vertical sections are reviewed.  Vertical sections demonstrate 3 hair follicles, one of which is in non-anagen phase, with partially retained sebaceous glands and mild perivascular and perifollicular lymphocytic inflammation with scattered plasma cells. The overlying epidermis appears normal.   Horizontal sections demonstrate approximately 28 hair follicles, with concentric perifollicular fibrosis, and compound follicles seen in groups of up to four as well as the presence of plasma cells and occasional   neutrophils. The inflammatory infiltrate involve the majority of the follicular units with plasma cells and occasional neutrophils.      Based on this information, the following was recommended:  Clobex shampoo 5 times per week  2% ketoconazole shampoo 2 times per week  Repeat WBC and TSH/T4  Add Allegra 180 mid day  If improved at the next phone follow-up, will continue same treatment plan. If not, would continue same approach but ask Mr. Lozoya to see Dr. Wolfe for ILK 10 injections to the spots.      We also did a tissue culture from the scalp and this showed light growth staph aureus and lugdunensis. He finished a course of clindamycin 150 mg BID for scalp folliculitis.     He reports he has been healthy since last visit and is using the medications as prescribed.  He  used to have constant itching and scabs on his scalp and now it is a more mild irritation with some pinkness. Patient reports no itching, pain, or burning of the scalp. He is not using the OGX shampoo. He cut the finasteride in half, from 10 mg to 5 mg a day.     Past Medical History:   Patient Active Problem List   Diagnosis     Cicatricial alopecia     History reviewed. No pertinent past medical history.  History reviewed. No pertinent surgical history.    Social History:  Patient reports that he has never smoked. He has never used smokeless tobacco.    Family History:  History reviewed. No pertinent family history.    Medications:  Current Outpatient Medications   Medication Sig Dispense Refill     acetaminophen (TYLENOL) 325 MG tablet        AFLURIA QUADRIVALENT 0.5 ML injection        busPIRone (BUSPAR) 10 MG tablet TAKE 1 TABLET BY MOUTH TWICE DAILY       clindamycin (CLEOCIN) 150 MG capsule Take one tablet twice a day as tolerated 30 capsule 1     clobetasol propionate (CLOBEX) 0.05 % external shampoo Apply to a dry scalp, leave on for 10 minutes, then lather and rinse, do 5 days of the week, use Head & Shoulders the other 2 days 60 mL 3     escitalopram (LEXAPRO) 10 MG tablet Take 10 mg by mouth       escitalopram (LEXAPRO) 10 MG tablet TK 1 T PO QD  12     escitalopram (LEXAPRO) 20 MG tablet TK 1 T PO QD  0     finasteride (PROSCAR) 5 MG tablet Take 10 mg by mouth       sildenafil (REVATIO) 20 MG tablet Take one tablet by mouth one hour prior to sex as needed. Do not take more than one dose every 24 hours.  2     fluocinonide (LIDEX) 0.05 % external solution        ketoconazole (NIZORAL) 2 % external shampoo Apply to a wet scalp, lather and massage into skin, leave on for a couple of minutes, then rinse, rotate with use of clobex shampoo (Patient not taking: Reported on 5/13/2019) 120 mL 5     predniSONE (DELTASONE) 10 MG tablet   0     triamcinolone (KENALOG) 0.1 % external lotion        Allergies    Allergen Reactions     Hydroxychloroquine Rash     Morbilliform eruption w/o systemic symptoms. Seen by Dr. Wolfe           Review of Systems:  -Constitutional: Otherwise feeling well today, in usual state of health.  -Skin: As above in HPI. No additional skin concerns.    Physical exam:  Vitals: /81 (BP Location: Right arm, Patient Position: Sitting, Cuff Size: Adult Regular)   Pulse 55   GEN: This is a well developed, well-nourished male in no acute distress, in a pleasant mood.    SKIN: Focused examination of the scalp and face was performed.  - Mild perifolicullar erythema and scale   - Hair pull test is negative and notes that it is not spreading   - LPPAI score of 1  -No other lesions of concern on areas examined.     Impression/Plan:  1. Scarring alopecia with androgenic involvement, biopsy proven    Continue Clobex shampoo five days a week and head and shoulders on the weekends    Repeat TSH and CBC     Kenalog intralesional injection procedure note: After verbal consent and discussion of risks including but not limited to atrophy, pain, and bruising, time out was performed, the patient underwent positioning and the area was prepped with isopropyl alcohol, 2 total cc of Kenalog 10 mg/cc was injected into 20 sites on the scalp. The patient tolerated the procedure well and left the Dermatology clinic in good condition.    Follow-up in August or  earlier for new or changing lesions.    Staff Involved:    Scribe Disclosure  I, Aleksandra Mckinnon, am serving as a scribe to document services personally performed by Dr. Julianna Shah MD, based on data collection and the provider's statements to me.    .Provider Disclosure:   I agree with above History, Review of Systems, Physical exam and Plan. I have reviewed the content of the documentation and have edited it as needed. I have personally performed the services documented here and the documentation accurately represents those services and the  decisions I have made.  ILK injections were done by me.    Julianna Shah MD  Professor and Chair  Department of Dermatology  [facility]                                   Pictures were placed in Pt's chart today for future reference.      Drug Administration Record    Prior to injection, verified patient identity using patient's name and date of birth.  Due to injection administration, patient instructed to remain in clinic for 15 minutes  afterwards, and to report any adverse reaction to me immediately.    Drug Name: triamcinolone acetonide(kenalog)  Dose: 2mL of triamcinolone 10mg/mL, 20mg dose  Route administered: ID  NDC #: [id number]: Kenalog-10 (9236-3307-83)  Amount of waste(mL):3  Reason for waste: Multi dose vial    LOT #: [id number]  SITE: scalp  : [employer]  EXPIRATION DATE: 9/2020         Again, thank you for allowing me to participate in the care of your patient.      Sincerely,    Julianna Shah MD

## 2019-05-13 NOTE — PROGRESS NOTES
Drug Administration Record    Prior to injection, verified patient identity using patient's name and date of birth.  Due to injection administration, patient instructed to remain in clinic for 15 minutes  afterwards, and to report any adverse reaction to me immediately.    Drug Name: triamcinolone acetonide(kenalog)  Dose: 2mL of triamcinolone 10mg/mL, 20mg dose  Route administered: ID  NDC #: [id number]: Kenalog-10 (6847-3171-38)  Amount of waste(mL):3  Reason for waste: Multi dose vial    LOT #: [id number]  SITE: scalp  : [employer]  EXPIRATION DATE: 9/2020

## 2019-08-12 ENCOUNTER — OFFICE VISIT (OUTPATIENT)
Dept: DERMATOLOGY | Facility: CLINIC | Age: 39
End: 2019-08-12
Payer: COMMERCIAL

## 2019-08-12 VITALS — HEART RATE: 53 BPM | DIASTOLIC BLOOD PRESSURE: 66 MMHG | SYSTOLIC BLOOD PRESSURE: 119 MMHG

## 2019-08-12 DIAGNOSIS — L30.9 DERMATITIS: ICD-10-CM

## 2019-08-12 DIAGNOSIS — Z79.899 MEDICATION MANAGEMENT: ICD-10-CM

## 2019-08-12 DIAGNOSIS — L66.10 LICHEN PLANO-PILARIS: ICD-10-CM

## 2019-08-12 DIAGNOSIS — R89.9 ABNORMAL LABORATORY TEST RESULT: ICD-10-CM

## 2019-08-12 DIAGNOSIS — R89.9 ABNORMAL LABORATORY TEST RESULT: Primary | ICD-10-CM

## 2019-08-12 LAB
T4 FREE SERPL-MCNC: 0.78 NG/DL (ref 0.76–1.46)
TSH SERPL DL<=0.005 MIU/L-ACNC: 8.02 MU/L (ref 0.4–4)

## 2019-08-12 ASSESSMENT — PAIN SCALES - GENERAL
PAINLEVEL: NO PAIN (0)
PAINLEVEL: MILD PAIN (2)

## 2019-08-12 NOTE — LETTER
"8/12/2019       RE: Florin Lozoya  [address]  [facility] [address]     Dear Colleague,    Thank you for referring your patient, Florin Lozoya, to the [facility] DERMATOLOGY at [facility]. Please see a copy of my visit note below.    [facility] Dermatology Note      Dermatology Problem List:  1. 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 based on tissue culture in 3/2019, treated with oral clindamycin,   -current treatment: finasteride 5 mg daily, clobetasol 0.05% shampoo 5x/week with Head and Shoulders 2x/week, and Allegra 180 mg daily  -ILK on 5/13/19 and 8/12/19  -labs done  2/4/19 (testosterone, TSH w/ free T4, vitamin D, iron studies, DHEA-S) were all normal  except for slightly elevated TSH  -labs done 5/13/19 (TSH w/ free T4 and CBC) showed slightly elevated TSH (7.55) and low WBC (3.8) - will repeat again today  -past treatments with minimal/no improvement:    Doxycycline and lidex: April 2016 - February 2017     Hydroxychlorquine: October 2016 - December 2016, discontinued 2/2 morbilliform rash eruption     Dapsone: January 2017 - May 2017, discontinued because of blurry vision     Acetretin: May 2017 - January 2018, discontinued because not showing any improvement    Finasteride (5 mg) and Minoxidil w/ occasional use of Lidex: January 2018 - May 2019     Prednisone burst for 6 weeks in August 2018     Ketoconazole 2% shampoo, Lidex 0.05% solution     Encounter Date: Aug 12, 2019    CC:  Chief Complaint   Patient presents with     Hair Loss     Florin is here today for a hair loss follow up- Florin states \"nothing has gotten worse\".          History of Present Illness:  Mr. Florin Lozoya is a 39 year old male who presents as a follow-up for hair loss. The patient was last seen 5/13/19 when he had ILK injecitons. States everything has been going well since the last time he was here with greatly " improved itching. Will still intermittently get itching at night, not present during the day. Denies pain or burning of the scalp. Has not noticed any increased hair loss since the last visit. Has not noticed any drastic improvement in growth of hair since he was last here, however suspects if anything his hair growth has improved. Continuing Finasteride 5mg daily, Allegra 180 mg once daily, and clobetasol 0.05% shampoo 5x/week with Head and Shoulders 2x/week. At the last visit, his TSH level was mildly elevated at 7.55 with a normal Free T4 of 0.83. He has not seen an endocrinologist or his primary care doctor about this recently, is unsure when they are planning to recheck his thyroid function levels.    Past Medical History:   Patient Active Problem List   Diagnosis     Cicatricial alopecia     No past medical history on file.  No past surgical history on file.    Social History:  Patient reports that he has never smoked. He has never used smokeless tobacco.    Family History:  No family history on file.    Medications:  Current Outpatient Medications   Medication Sig Dispense Refill     acetaminophen (TYLENOL) 325 MG tablet        AFLURIA QUADRIVALENT 0.5 ML injection        busPIRone (BUSPAR) 10 MG tablet TAKE 1 TABLET BY MOUTH TWICE DAILY       clindamycin (CLEOCIN) 150 MG capsule Take one tablet twice a day as tolerated 30 capsule 1     clobetasol propionate (CLOBEX) 0.05 % external shampoo Apply to a dry scalp, leave on for 10 minutes, then lather and rinse, do 5 days of the week, use Head & Shoulders the other 2 days 60 mL 3     escitalopram (LEXAPRO) 10 MG tablet Take 10 mg by mouth       escitalopram (LEXAPRO) 10 MG tablet TK 1 T PO QD  12     escitalopram (LEXAPRO) 20 MG tablet TK 1 T PO QD  0     finasteride (PROSCAR) 5 MG tablet Take 10 mg by mouth       fluocinonide (LIDEX) 0.05 % external solution        ketoconazole (NIZORAL) 2 % external shampoo Apply to a wet scalp, lather and massage into skin,  leave on for a couple of minutes, then rinse, rotate with use of clobex shampoo (Patient not taking: Reported on 5/13/2019) 120 mL 5     predniSONE (DELTASONE) 10 MG tablet   0     sildenafil (REVATIO) 20 MG tablet Take one tablet by mouth one hour prior to sex as needed. Do not take more than one dose every 24 hours.  2     triamcinolone (KENALOG) 0.1 % external lotion        Allergies   Allergen Reactions     Hydroxychloroquine Rash     Morbilliform eruption w/o systemic symptoms. Seen by Dr. Wolfe           Review of Systems:  -Const: Denies fevers, chills or changes in weight.   -Constitutional: Otherwise feeling well today, in usual state of health.  -HEENT: Patient denies nonhealing oral sores.  -Skin: As above in HPI. No additional skin concerns.    Physical exam:  Vitals: /66, pulse 53  GEN: This is a well developed, well-nourished male in no acute distress, in a pleasant mood.   SKIN: Focused examination of the face and scalp was performed.  -very mild background scalp erythema and mild perifollicular scale and erythema  -Hair pull test is negative  -LLPAI score of 1  - Hair pull tests were negative  -No other lesions of concern on areas examined.     Impression/Plan:  1. Scarring alopecia with androgenic involvement, biopsy proven, with perifollicular erythema and scale improved after ILK injections and topical therapy    Continue Clobex shampoo five days a week and Head and Shoulders on the weekends; can start using Clobex every other day and increase use as needed for itch    Continue finasteride 5mg daily and Allegra 180 mg daily    Repeat TSH w/ free T4 today    Kenalog intralesional injection procedure note (performed by faculty): After verbal consent and discussion of risks including but not limited to atrophy, pain, and bruising,  time out was performed, 2 total cc of Kenalog 10 mg/cc was injected into 20 sites on the scalp.  The patient tolerated the procedure well, a 4 out of 10 on the pain  scale, and left the Dermatology clinic in good condition.    Follow up with Dr. Wolfe in 3 months and here again in 6 months    CC Igor Wolfe MD  [facility] DERMATOLOGY  [address] [place] [phone] on close of this encounter.  Follow-up in 6 months, earlier for new or changing lesions.     Staff Involved:  I, Rafia Larsen, MS3, saw and examined the patient in the presence of Dr. Shah.    Staff Physician:  I was present with the medical student who participated in the service and in the documentation of the note. I have verified the history and personally performed the physical exam and medical decision making. I agree with the assessment and plan of care as documented in the note.  ILK injections were done together as noted above. Majority of lesions were done by me.    Julianna Shah MD  Professor and Chair  Department of Dermatology  [facility]: Phone: [phone], Fax:[phone]  [facility]: Phone: [phone], Fax: [phone]                                       Pictures were placed in Pt's chart today for future reference.      Drug Administration Record    Prior to injection, verified patient identity using patient's name and date of birth.  Due to injection administration, patient instructed to remain in clinic for 15 minutes  afterwards, and to report any adverse reaction to me immediately.    Drug Name: triamcinolone acetonide(kenalog)  Dose: 2mL of triamcinolone 10mg/mL, 20mg dose  Route administered: ID  NDC #: [id number]: Kenalog-10 (6434-4344-28)  Amount of waste(mL):3mL  Reason for waste: Multi dose vial    LOT #: [id number]  SITE: See provider note   : [employer]  EXPIRATION DATE: 11/20        Again, thank you for allowing me to participate in the care of your patient.      Sincerely,    Julianna Shah MD

## 2019-08-12 NOTE — NURSING NOTE
"Dermatology Rooming Note    Florin Lozoya's goals for this visit include:   Chief Complaint   Patient presents with     Hair Loss     Florin is here today for a hair loss follow up- Florin states \"nothing has gotten worse\".      Keely Herrera, RMKUNAL     "

## 2019-08-12 NOTE — PROGRESS NOTES
Drug Administration Record    Prior to injection, verified patient identity using patient's name and date of birth.  Due to injection administration, patient instructed to remain in clinic for 15 minutes  afterwards, and to report any adverse reaction to me immediately.    Drug Name: triamcinolone acetonide(kenalog)  Dose: 2mL of triamcinolone 10mg/mL, 20mg dose  Route administered: ID  NDC #: [id number]: Kenalog-10 (1238-1601-07)  Amount of waste(mL):3mL  Reason for waste: Multi dose vial    LOT #: [id number]  SITE: See provider note   : [employer]  EXPIRATION DATE: 11/20

## 2019-08-12 NOTE — PROGRESS NOTES
"[facility] Dermatology Note      Dermatology Problem List:  1. 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 based on tissue culture in 3/2019, treated with oral clindamycin,   -current treatment: finasteride 5 mg daily, clobetasol 0.05% shampoo 5x/week with Head and Shoulders 2x/week, and Allegra 180 mg daily  -ILK on 5/13/19 and 8/12/19  -labs done 2/4/19 (testosterone, TSH w/ free T4, vitamin D, iron studies, DHEA-S) were all normal except for slightly elevated TSH  -labs done 5/13/19 (TSH w/ free T4 and CBC) showed slightly elevated TSH (7.55) and low WBC (3.8) - will repeat again today  -past treatments with minimal/no improvement:    Doxycycline and lidex: April 2016 - February 2017     Hydroxychlorquine: October 2016 - December 2016, discontinued 2/2 morbilliform rash eruption     Dapsone: January 2017 - May 2017, discontinued because of blurry vision     Acetretin: May 2017 - January 2018, discontinued because not showing any improvement    Finasteride (5 mg) and Minoxidil w/ occasional use of Lidex: January 2018 - May 2019     Prednisone burst for 6 weeks in August 2018     Ketoconazole 2% shampoo, Lidex 0.05% solution     Encounter Date: Aug 12, 2019    CC:  Chief Complaint   Patient presents with     Hair Loss     Florin is here today for a hair loss follow up- Florin states \"nothing has gotten worse\".          History of Present Illness:  Mr. Florin Lozoya is a 39 year old male who presents as a follow-up for hair loss. The patient was last seen 5/13/19 when he had ILK injecitons. States everything has been going well since the last time he was here with greatly improved itching. Will still intermittently get itching at night, not present during the day. Denies pain or burning of the scalp. Has not noticed any increased hair loss since the last visit. Has not noticed any drastic improvement in growth of hair since he was last here, however " suspects if anything his hair growth has improved. Continuing Finasteride 5mg daily, Allegra 180 mg once daily, and clobetasol 0.05% shampoo 5x/week with Head and Shoulders 2x/week. At the last visit, his TSH level was mildly elevated at 7.55 with a normal Free T4 of 0.83. He has not seen an endocrinologist or his primary care doctor about this recently, is unsure when they are planning to recheck his thyroid function levels.    Past Medical History:   Patient Active Problem List   Diagnosis     Cicatricial alopecia     No past medical history on file.  No past surgical history on file.    Social History:  Patient reports that he has never smoked. He has never used smokeless tobacco.    Family History:  No family history on file.    Medications:  Current Outpatient Medications   Medication Sig Dispense Refill     acetaminophen (TYLENOL) 325 MG tablet        AFLURIA QUADRIVALENT 0.5 ML injection        busPIRone (BUSPAR) 10 MG tablet TAKE 1 TABLET BY MOUTH TWICE DAILY       clindamycin (CLEOCIN) 150 MG capsule Take one tablet twice a day as tolerated 30 capsule 1     clobetasol propionate (CLOBEX) 0.05 % external shampoo Apply to a dry scalp, leave on for 10 minutes, then lather and rinse, do 5 days of the week, use Head & Shoulders the other 2 days 60 mL 3     escitalopram (LEXAPRO) 10 MG tablet Take 10 mg by mouth       escitalopram (LEXAPRO) 10 MG tablet TK 1 T PO QD  12     escitalopram (LEXAPRO) 20 MG tablet TK 1 T PO QD  0     finasteride (PROSCAR) 5 MG tablet Take 10 mg by mouth       fluocinonide (LIDEX) 0.05 % external solution        ketoconazole (NIZORAL) 2 % external shampoo Apply to a wet scalp, lather and massage into skin, leave on for a couple of minutes, then rinse, rotate with use of clobex shampoo (Patient not taking: Reported on 5/13/2019) 120 mL 5     predniSONE (DELTASONE) 10 MG tablet   0     sildenafil (REVATIO) 20 MG tablet Take one tablet by mouth one hour prior to sex as needed. Do not take  more than one dose every 24 hours.  2     triamcinolone (KENALOG) 0.1 % external lotion        Allergies   Allergen Reactions     Hydroxychloroquine Rash     Morbilliform eruption w/o systemic symptoms. Seen by Dr. Wolfe           Review of Systems:  -Const: Denies fevers, chills or changes in weight.   -Constitutional: Otherwise feeling well today, in usual state of health.  -HEENT: Patient denies nonhealing oral sores.  -Skin: As above in HPI. No additional skin concerns.    Physical exam:  Vitals: /66, pulse 53  GEN: This is a well developed, well-nourished male in no acute distress, in a pleasant mood.   SKIN: Focused examination of the face and scalp was performed.  -very mild background scalp erythema and mild perifollicular scale and erythema  -Hair pull test is negative  -LLPAI score of 1  - Hair pull tests were negative  -No other lesions of concern on areas examined.     Impression/Plan:  1. Scarring alopecia with androgenic involvement, biopsy proven, with perifollicular erythema and scale improved after ILK injections and topical therapy    Continue Clobex shampoo five days a week and Head and Shoulders on the weekends; can start using Clobex every other day and increase use as needed for itch    Continue finasteride 5mg daily and Allegra 180 mg daily    Repeat TSH w/ free T4 today    Kenalog intralesional injection procedure note (performed by faculty): After verbal consent and discussion of risks including but not limited to atrophy, pain, and bruising,  time out was performed, 2 total cc of Kenalog 10 mg/cc was injected into 20 sites on the scalp.  The patient tolerated the procedure well, a 4 out of 10 on the pain scale, and left the Dermatology clinic in good condition.    Follow up with Dr. Wolfe in 3 months and here again in 6 months    CC Igor Wolfe MD  [facility] DERMATOLOGY  [address] [place] [id number] on close of this encounter.  Follow-up in 6 months, earlier for new or  changing lesions.     Staff Involved:  I, Rafia Larsen, MS3, saw and examined the patient in the presence of Dr. Shah.    Staff Physician:  I was present with the medical student who participated in the service and in the documentation of the note. I have verified the history and personally performed the physical exam and medical decision making. I agree with the assessment and plan of care as documented in the note.  ILK injections were done together as noted above. Majority of lesions were done by me.    Julianna Shah MD  Professor and Chair  Department of Dermatology  [facility]: Phone: [phone], Fax:[phone]  [facility]: Phone: [phone], Fax: [phone]

## 2019-11-20 ENCOUNTER — TELEPHONE (OUTPATIENT)
Dept: DERMATOLOGY | Facility: CLINIC | Age: 39
End: 2019-11-20

## 2019-11-20 NOTE — TELEPHONE ENCOUNTER
Attempted to reach patient via telephone, not answered. Will repeat attempt to reach patient. Recommend patient follow up with his regular dermatologist in [place], Dr. Wolfe. Per Dr. Shah follow up with Dr. Wolfe was planned for around this time.

## 2020-03-11 ENCOUNTER — HEALTH MAINTENANCE LETTER (OUTPATIENT)
Age: 40
End: 2020-03-11

## 2021-01-03 ENCOUNTER — HEALTH MAINTENANCE LETTER (OUTPATIENT)
Age: 41
End: 2021-01-03

## 2021-04-25 ENCOUNTER — HEALTH MAINTENANCE LETTER (OUTPATIENT)
Age: 41
End: 2021-04-25

## 2021-10-10 ENCOUNTER — HEALTH MAINTENANCE LETTER (OUTPATIENT)
Age: 41
End: 2021-10-10

## 2022-05-21 ENCOUNTER — HEALTH MAINTENANCE LETTER (OUTPATIENT)
Age: 42
End: 2022-05-21

## 2022-09-18 ENCOUNTER — HEALTH MAINTENANCE LETTER (OUTPATIENT)
Age: 42
End: 2022-09-18

## 2023-06-04 ENCOUNTER — HEALTH MAINTENANCE LETTER (OUTPATIENT)
Age: 43
End: 2023-06-04